# Patient Record
Sex: FEMALE | Race: WHITE | Employment: OTHER | URBAN - METROPOLITAN AREA
[De-identification: names, ages, dates, MRNs, and addresses within clinical notes are randomized per-mention and may not be internally consistent; named-entity substitution may affect disease eponyms.]

---

## 2017-07-03 ENCOUNTER — TRANSFERRED RECORDS (OUTPATIENT)
Dept: HEALTH INFORMATION MANAGEMENT | Facility: CLINIC | Age: 47
End: 2017-07-03

## 2017-07-06 ENCOUNTER — TELEPHONE (OUTPATIENT)
Dept: ONCOLOGY | Facility: CLINIC | Age: 47
End: 2017-07-06

## 2017-07-06 NOTE — TELEPHONE ENCOUNTER
I received an outside note from Dr Nirmala Jacob.  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  ER + MS negative.  At the 9 o'clock position.  It is unclear  Whether this is recurrent disease or new disease.  There is a second area of calcifications 3 cm away from this.    Surgical excision was recommended.  Marquita is going to think about it.    I put a call into Marquita today to discuss her diagnosis.  I would recommend complete surgical excision with mastectomy.    Saniya Fallon MD

## 2017-08-12 ENCOUNTER — HEALTH MAINTENANCE LETTER (OUTPATIENT)
Age: 47
End: 2017-08-12

## 2017-11-09 ENCOUNTER — TRANSFERRED RECORDS (OUTPATIENT)
Dept: HEALTH INFORMATION MANAGEMENT | Facility: CLINIC | Age: 47
End: 2017-11-09

## 2017-12-11 ENCOUNTER — TRANSFERRED RECORDS (OUTPATIENT)
Dept: HEALTH INFORMATION MANAGEMENT | Facility: CLINIC | Age: 47
End: 2017-12-11

## 2018-11-19 ENCOUNTER — DOCUMENTATION ONLY (OUTPATIENT)
Dept: OTHER | Facility: CLINIC | Age: 48
End: 2018-11-19

## 2019-11-06 ENCOUNTER — HEALTH MAINTENANCE LETTER (OUTPATIENT)
Age: 49
End: 2019-11-06

## 2020-11-29 ENCOUNTER — HEALTH MAINTENANCE LETTER (OUTPATIENT)
Age: 50
End: 2020-11-29

## 2021-02-14 ENCOUNTER — HEALTH MAINTENANCE LETTER (OUTPATIENT)
Age: 51
End: 2021-02-14

## 2021-08-19 ENCOUNTER — TRANSCRIBE ORDERS (OUTPATIENT)
Dept: OTHER | Age: 51
End: 2021-08-19

## 2021-08-19 DIAGNOSIS — C50.919 BREAST CANCER (H): Primary | ICD-10-CM

## 2021-09-19 ENCOUNTER — HEALTH MAINTENANCE LETTER (OUTPATIENT)
Age: 51
End: 2021-09-19

## 2021-09-24 ENCOUNTER — TELEPHONE (OUTPATIENT)
Dept: ONCOLOGY | Facility: CLINIC | Age: 51
End: 2021-09-24

## 2021-09-27 ENCOUNTER — VIRTUAL VISIT (OUTPATIENT)
Dept: ONCOLOGY | Facility: CLINIC | Age: 51
End: 2021-09-27
Attending: INTERNAL MEDICINE
Payer: COMMERCIAL

## 2021-09-27 DIAGNOSIS — D05.11 DUCTAL CARCINOMA IN SITU (DCIS) OF RIGHT BREAST: Primary | ICD-10-CM

## 2021-09-27 PROCEDURE — 999N001193 HC VIDEO/TELEPHONE VISIT; NO CHARGE

## 2021-09-27 PROCEDURE — 99212 OFFICE O/P EST SF 10 MIN: CPT | Performed by: INTERNAL MEDICINE

## 2021-09-27 NOTE — LETTER
9/27/2021         RE: Marquita Barrios  [address] [address]        Dear Colleague,    Thank you for referring your patient, Marquita Barrios, to the [facility]. Please see a copy of my visit note below.    September 27, 2021    REASON FOR VISIT:  Marquita returns today for followup over the telephone to discuss menopausal symptoms and whether to consider antiestrogen therapy as a chemopreventive.        HISTORY OF PRESENT ILLNESS:  Marquita is a 50-year-old female with a history of right-sided DCIS and subsequent invasive carcinoma who comes in today for followup.      It has been several years since I saw her last.  Briefly, I met her in the summer of 2013 at which time she had undergone a screening mammography, which showed a cluster of microcalcifications approximately 1 cm.  She was BRCA1 and 2 negative.  She underwent a lumpectomy with seed localization for the microcalcifications.  Final pathology showed less than 1 mm margins at the posterior and superior margin aspect.  An additional resection was recommended.  Dr. Cassandra Soto did a re-excision of the area and the margin showed focal positivity and she was recommended to have a mastectomy.  She saw Dr. Lisandro Giang in consultation here as well as someone from [place], who again recommended mastectomy with or without reconstruction.  She declined additional resection, radiation therapy or adjuvant endocrine therapy.       Abnormal breast imaging 2017:  Seed localization partial mastectomy -   Date of Surgery: 11/9/17    Type of Surgery: right breast re-excision of previous lumpectomy site.  From the outside records, it is reported she had a T1b invasive cancer.  Oncotype 17.  I cannot access this pathology report however.    Fall 2018 - enhancement right breast.  She had stopped nursing at this time.  She opted for right mastectomy.      January 2019:  Right mastectomy with SNLB:  Breast, right, mastectomy:       - No  residual invasive or in situ carcinoma identified.      B. Lymph nodes, right axillary sentinel, excision:       - Three lymph nodes free of malignancy (0/3).      She had a pregnancy with a donor egg and her 's sperm.  Her daughter is now 5 years old and in . She is thrilled with being a mom.     She says she is feeling well.  She has been having problems with her spirits and mood and concerned about whether or not her cancer is going to come back.  She often wonders if she should reconsider the use of chemopreventive medications.  She remains on sertraline.     She says she has not noticed any lumps or bumps.  No nodules, masses.  She is eating and drinking well.         No nodules or masses.  No chest pain or shortness of breath.      Her last mammogram was Dec 2020.  She has been seeing Dr Gregorio Avalos at [facility].     REVIEW OF SYSTEMS:  A 10-point review of systems is otherwise negative.      PHYSICAL EXAMINATION:    Telephone encounter:  Speaking clearly and fluently.  Overall call approximately 15 min     Reviewed her recent mammogram    COMPARISON: Mammogram dated 12/2/2020, 10/7/2019 and 6/13/2017     FINDINGS: Left CC and MLO spot C-Views with tomosynthesis. Persistence of the circumscribed mass at the 10-11:00 position, posterior depth. Persistence of the circumscribed mass at the 12:00 position, middle depth.     Left breast ultrasound at the 10:30 position 6 cm from the nipple shows a 0.7 x 0.5 x 0.6 cm cyst, correlating with the mammogram finding. Left breast ultrasound at the 12:00 position 3 cm from the nipple shows a septated cyst versus 2 adjacent cysts measuring 0.8 x 0.5 x 0.8 cm. No suspicious mass.      ASSESSMENT AND PLAN:  This is a 50-year-old female, perimenopausal, with right-sided ductal carcinoma in situ, and then invasive cancer T1bN0 s/p mastectomy in 2018.      She had complete resection of her right breast.  I am trying to obtain her outside records.       Given her overall surgical resection, we reviewed the pros/cons of endocrine therapy.  She is still perimenopausal.  Tamoxifen could be considered at 5 mg though this has only been studied in DCIS.  She has concerns about standard dosing.      We reviewed other lifestyle options for disease prevention . Ultimately after reviewing pros and cons of her medication, she will remain off of endocrine therapy.    She is due for mammogram of the left breast in December; she will get this at [facility].    I am happy to see her at any time.    Discussed continuing healthy diet, exercise, and low alcohol use intake.       Saniya Fallon MD

## 2021-09-29 NOTE — PROGRESS NOTES
September 27, 2021    REASON FOR VISIT:  Marquita returns today for followup over the telephone to discuss menopausal symptoms and whether to consider antiestrogen therapy as a chemopreventive.        HISTORY OF PRESENT ILLNESS:  Marquita is a 50-year-old female with a history of right-sided DCIS and subsequent invasive carcinoma who comes in today for followup.      It has been several years since I saw her last.  Briefly, I met her in the summer of 2013 at which time she had undergone a screening mammography, which showed a cluster of microcalcifications approximately 1 cm.  She was BRCA1 and 2 negative.  She underwent a lumpectomy with seed localization for the microcalcifications.  Final pathology showed less than 1 mm margins at the posterior and superior margin aspect.  An additional resection was recommended.  Dr. Cassandra Soto did a re-excision of the area and the margin showed focal positivity and she was recommended to have a mastectomy.  She saw Dr. Lisandro Giang in consultation here as well as someone from [place], who again recommended mastectomy with or without reconstruction.  She declined additional resection, radiation therapy or adjuvant endocrine therapy.       Abnormal breast imaging 2017:  Seed localization partial mastectomy -   Date of Surgery: 11/9/17    Type of Surgery: right breast re-excision of previous lumpectomy site.  From the outside records, it is reported she had a T1b invasive cancer.  Oncotype 17.  I cannot access this pathology report however.    Fall 2018 - enhancement right breast.  She had stopped nursing at this time.  She opted for right mastectomy.      January 2019:  Right mastectomy with SNLB:  Breast, right, mastectomy:       - No residual invasive or in situ carcinoma identified.      B. Lymph nodes, right axillary sentinel, excision:       - Three lymph nodes free of malignancy (0/3).      She had a pregnancy with a donor egg and her 's sperm.  Her daughter is now 5 years old and  in . She is thrilled with being a mom.     She says she is feeling well.  She has been having problems with her spirits and mood and concerned about whether or not her cancer is going to come back.  She often wonders if she should reconsider the use of chemopreventive medications.  She remains on sertraline.     She says she has not noticed any lumps or bumps.  No nodules, masses.  She is eating and drinking well.         No nodules or masses.  No chest pain or shortness of breath.      Her last mammogram was Dec 2020.  She has been seeing Dr Gregorio Avalos at [facility].     REVIEW OF SYSTEMS:  A 10-point review of systems is otherwise negative.      PHYSICAL EXAMINATION:    Telephone encounter:  Speaking clearly and fluently.  Overall call approximately 15 min     Reviewed her recent mammogram    COMPARISON: Mammogram dated 12/2/2020, 10/7/2019 and 6/13/2017     FINDINGS: Left CC and MLO spot C-Views with tomosynthesis. Persistence of the circumscribed mass at the 10-11:00 position, posterior depth. Persistence of the circumscribed mass at the 12:00 position, middle depth.     Left breast ultrasound at the 10:30 position 6 cm from the nipple shows a 0.7 x 0.5 x 0.6 cm cyst, correlating with the mammogram finding. Left breast ultrasound at the 12:00 position 3 cm from the nipple shows a septated cyst versus 2 adjacent cysts measuring 0.8 x 0.5 x 0.8 cm. No suspicious mass.      ASSESSMENT AND PLAN:  This is a 50-year-old female, perimenopausal, with right-sided ductal carcinoma in situ, and then invasive cancer T1bN0 s/p mastectomy in 2018.      She had complete resection of her right breast.  I am trying to obtain her outside records.      Given her overall surgical resection, we reviewed the pros/cons of endocrine therapy.  She is still perimenopausal.  Tamoxifen could be considered at 5 mg though this has only been studied in DCIS.  She has concerns about standard dosing.      We reviewed other  lifestyle options for disease prevention . Ultimately after reviewing pros and cons of her medication, she will remain off of endocrine therapy.    She is due for mammogram of the left breast in December; she will get this at [facility].    I am happy to see her at any time.    Discussed continuing healthy diet, exercise, and low alcohol use intake.       Saniya Fallon MD              Pathology reviewed confirming focus of invasive disease.          Pathology Report (11/09/2017 6:00 AM CST)   Component Value Ref Range Performed At Pathologist Signature   Path: REVISED SURGICAL PATHOLOGY REPORT   Report last revised: 12/15/2017  09:52   Pathology #: [id number]                     Date Obtained: 11/09/2017                                                Date Received: 11/09/2017   DIAGNOSIS:   A) Breast, right medial, seed localized excision:        - Ductal carcinoma in situ        -   Specimen   Procedure:  Radioactive seed localization   Lymph Node Sampling:  No lymph nodes present   Specimen Laterality:  Right   Tumor   Size (Extent) of DCIS   Size:  20 mm   Number of Blocks Examined:  8   Number of Blocks With DCIS:  6   Histologic Type:  Ductal carcinoma in situ.  Classified as Tis (DCIS)       or Tis ([place])   Nuclear Grade:  Grade II (intermediate)   Necrosis:  Present, central   Margins   Margins:  Margins uninvolved by DCIS   Distance of DCIS From Closest Margin:  0.2 mm   Closest Uninvolved Margin:  Superior   Distance of DCIS to Margins   Anterior:  0.5 mm   Medial:  0.6 mm   Accessory Findings   Treatment Effect:  No known presurgical therapy   Microcalcifications:  Present in DCIS   Stage (pTNM)   Primary Tumor (pT):  pTis (DCIS):  Ductal carcinoma in situ   Regional Lymph Nodes (pN):  pNX:  Regional lymph nodes cannot be       assessed (e.g., previously removed, or not removed for pathologic       study)     B) Breast, right lateral, seed localized excision:        - Invasive ductal carcinoma  "    Specimen   Procedure:  Excision with image-guided localization   Lymph Node Sampling:  No lymph nodes present   Specimen Laterality:  Right   Tumor   Histologic Type:  Invasive mammary carcinoma of no special type       (ductal, not otherwise specified)   Histologic Grade (Jupiter Histologic Score)   Glandular (Acinar) / Tubular Differentiation:  Score 3   Nuclear Pleomorphism:  Score 3   Number of Mitoses per 10 High-Power Fields:  1   Diameter of Microscope Field:  0.54 mm   Mitotic Rate:  Score 1   Overall Grade:  Grade 2   Tumor Size: Size of Largest Invasive Carcinoma:  8 mm   Ductal Carcinoma In Situ (DCIS):  DCIS is present   Further Classification of DCIS:  Positive for EIC   Estimated Size of DCIS:  20 mm   Number of Blocks with DCIS:  6   Number of Blocks Examined:  8   Nuclear Grade:  Grade III (high)   Necrosis:  Present, central (expansive \"comedo\" necrosis)   Lobular Carcinoma In Situ (LCIS):  Not identified   Accessory Tumor Findings   Lymph-Vascular Invasion:  Not identified   Dermal Lymph-Vascular Invasion:  Not identified   Microcalcifications:  Present in DCIS   Treatment Effect: Response To Presurgical (Neoadjuvant) Therapy:  No       known presurgical therapy   Margins   Invasive Carcinoma:  Margins uninvolved by invasive carcinoma   Distance From Closest Margin:  .2 mm   Closest Uninvolved Margin:  Superior   Ductal Carcinoma In Situ (DCIS):  Margin(s) positive for DCIS   Margin(s):  Medial   Medial:  Minimal / moderate   DCIS is 0.2mm from the lateral and inferior margins   Stage (pTNM)   Primary Tumor (Invasive Carcinoma) (pT):  pT1b: Tumor > 5 mm but <= 10       mm in greatest dimension   Regional Lymph Nodes (pN)   Category (pN):  pNX: Regional lymph nodes cannot be assessed     [facility] March 2016 Annual Release       Comment: Specimen B reviewed by SIEGEL. Diagnosis discussed with Dr. Cohen       Addendum 2, 12/12/2017   Diagnosis previously reported on 11/13/2017.  Tissue from this " "case was   submitted to [facility]. for Oncotype DX analysis. The report   will be available in the online medical record.                                        MARYSOL DERAS MD                             (electronic signature)                               12/15/2017  09:52       CLINICAL NOTES:   Right breast calcifications     GROSS DESCRIPTION:   A.  The specimen is received fresh for immediate gross labeled \"medial     right breast lobectomy\" and consists of a 3 g, 2.8 x 2.2 x 1.6 cm     right breast lumpectomy specimen with an attached ellipse of light     tan skin on the anterior surface measuring 2 x 0.7 cm.  A short     stitch for the superior position and a long stitch marks the lateral     position.  The specimen is inked as follows: Superior-orange,     inferior-yellow, medial-green, lateral-blue, anterior-red,     posterior-black.  The specimen is serially sectioned from medial to     lateral to reveal focally hemorrhagic, bright yellow breast tissue.     No discrete mass or biopsy site is grossly identified.  A     radioactive seed is identified in the center of the specimen.  The     seed is removed and sent to nuclear medicine per protocol.  The     specimen is entirely sequentially submitted from medial to lateral     in 8 blocks.  In formalin at 1:45 PM.     B.  The specimen is received fresh for immediate gross labeled     \"lateral right breast lumpectomy\" and consists of a 6 g, 4 x 2.5 x 2     cm breast lobectomy specimen with an attached ellipse of light tan     skin on the anterior surface measuring 2 x 0.2 cm.  A short stitch     which the superior position and a long stitch marks the lateral     position.  The specimen is inked as follows: Superior-orange,     inferior-yellow, medial-green, lateral-blue, anterior-red,     posterior-black.  The specimen is serially sectioned from anterior     to posterior to reveal an ill-defined area of nodularity in the     center of the specimen, " "grossly abutting the medial margin.  This     area measures 1 x 0.8 x 0.5 cm.  A biopsy site is located adjacent     to the nodular area, measuring 0.7 cm in diameter.  A radioactive     seed is also identified in this area.  The seed is removed and sent     to nuclear medicine per protocol.  The specimen is entirely     sequentially submitted from anterior to posterior in 8 blocks.  In     formalin at 2:00 PM.         [facility]   IOC/FROZEN:   A.  Immediate gross: \"No discrete mass is identified.\"  (FUNMI)     B.  Immediate gross: \"Possible nodularity present, abutting medial     margin.\"  (FUNMI)           [facility]   MICROSCOPIC DESCRIPTION:   The microscopic examination has been performed.     ADDENDUM/SUPPLEMENTAL:   BREAST CANCER PROGNOSTIC/THERAPEUTIC ASSAYS:     HORMONE RECEPTOR ANALYSIS BY IHC   ESTROGEN RECEPTOR INTERPRETATION: Positive    Percent staining:?90  Intensity: Strong   PROGESTERONE RECEPTOR INTERPRETATION: Positive    Percent staining:10  Intensity: Moderate to strong     Grading Criteria: Semi-quantitative, manual method   Positive 1% or greater;  Negative  < 1%   Internal and external controls: Adequate   Standard assay conditions: Met     Standard Assay Conditions/Staining method used:   Fixative: 10% NBF; Cold ischemia time:  < 60 min; Duration of     fixation: 6-72 hours; Ab Clones: ER clone: [id number]; UT Clone: 16;     Detection system: Leica Polymer Refine; Time in Ag retrieval: 20     minutes; Ag retrieval type: Leica ER1, low Ph.  This assay has not     been validated on decalcified tissues.  Results should be     interpreted with caution given the likelihood of false negativity on     decalcified specimens.     [id number] (c-erB2) PROTEIN OVEREXPRESSION ASSAY   INTERPRETATION: Negative     STAINING INTENSITY SCORE (0 to 3+): 1+     Adequacy of Sample for Evaluation:  Adequate     Date of Assay:11/12/17     Assay Method:  DAKO HercepTest ; Controls:  DAKO cell lines with high,     intermediate and low " level protein expression.  In house tissue     control; Specimen Fixation and Processing: Cold ischemia time < 60     minutes; Formalin fixed (6-72 hours); paraffin embedded; Time in     antigen retrieval 60 min; Detection system: DAKO FLEX DAB.     Interpreted using the HercepTest scoring guidelines.     Semi-quantitative, manual method.     The performance characteristics for this test have been evaluated by     the pathologists from [facility].  The tissue has     been fixed 6-72 hours according to ASCO/CAP recommendations.  This     test has been validated against the FDA approved DAKO Herceptest and     FISH detection methods with greater than 95% concordance.  This     assay has not been validated on decalcified tissues.  Results should     be interpreted with caution given the likelihood of false negativity     on decalcified specimens.     Performed at [facility], [address] [place] [address]   [id number]

## 2022-01-09 ENCOUNTER — HEALTH MAINTENANCE LETTER (OUTPATIENT)
Age: 52
End: 2022-01-09

## 2022-03-06 ENCOUNTER — HEALTH MAINTENANCE LETTER (OUTPATIENT)
Age: 52
End: 2022-03-06

## 2022-11-21 ENCOUNTER — HEALTH MAINTENANCE LETTER (OUTPATIENT)
Age: 52
End: 2022-11-21

## 2023-04-16 ENCOUNTER — HEALTH MAINTENANCE LETTER (OUTPATIENT)
Age: 53
End: 2023-04-16

## 2024-06-23 ENCOUNTER — HEALTH MAINTENANCE LETTER (OUTPATIENT)
Age: 54
End: 2024-06-23